# Patient Record
Sex: FEMALE | Race: BLACK OR AFRICAN AMERICAN | Employment: UNEMPLOYED | URBAN - METROPOLITAN AREA
[De-identification: names, ages, dates, MRNs, and addresses within clinical notes are randomized per-mention and may not be internally consistent; named-entity substitution may affect disease eponyms.]

---

## 2023-11-13 ENCOUNTER — HOSPITAL ENCOUNTER (EMERGENCY)
Age: 33
Discharge: HOME | End: 2023-11-13
Attending: STUDENT IN AN ORGANIZED HEALTH CARE EDUCATION/TRAINING PROGRAM
Payer: COMMERCIAL

## 2023-11-13 VITALS
TEMPERATURE: 97.9 F | DIASTOLIC BLOOD PRESSURE: 69 MMHG | OXYGEN SATURATION: 99 % | RESPIRATION RATE: 17 BRPM | HEART RATE: 86 BPM | SYSTOLIC BLOOD PRESSURE: 124 MMHG

## 2023-11-13 DIAGNOSIS — K64.4 EXTERNAL HEMORRHOID: Primary | ICD-10-CM

## 2023-11-13 PROCEDURE — 6370000000 HC RX 637 (ALT 250 FOR IP): Performed by: STUDENT IN AN ORGANIZED HEALTH CARE EDUCATION/TRAINING PROGRAM

## 2023-11-13 PROCEDURE — 99283 EMERGENCY DEPT VISIT LOW MDM: CPT

## 2023-11-13 RX ORDER — ACETAMINOPHEN 500 MG
1000 TABLET ORAL ONCE
Status: COMPLETED | OUTPATIENT
Start: 2023-11-13 | End: 2023-11-13

## 2023-11-13 RX ORDER — IBUPROFEN 800 MG/1
800 TABLET ORAL ONCE
Status: COMPLETED | OUTPATIENT
Start: 2023-11-13 | End: 2023-11-13

## 2023-11-13 RX ORDER — POLYETHYLENE GLYCOL 3350 17 G/17G
17 POWDER, FOR SOLUTION ORAL DAILY
Qty: 1530 G | Refills: 1 | Status: SHIPPED | OUTPATIENT
Start: 2023-11-13 | End: 2024-05-11

## 2023-11-13 RX ORDER — IBUPROFEN 600 MG/1
600 TABLET ORAL EVERY 6 HOURS PRN
Qty: 120 TABLET | Refills: 0 | Status: SHIPPED | OUTPATIENT
Start: 2023-11-13

## 2023-11-13 RX ORDER — DOCUSATE SODIUM 100 MG/1
100 CAPSULE, LIQUID FILLED ORAL 2 TIMES DAILY
Qty: 90 CAPSULE | Refills: 0 | Status: SHIPPED | OUTPATIENT
Start: 2023-11-13

## 2023-11-13 RX ORDER — HYDROCORTISONE 25 MG/G
CREAM TOPICAL
Qty: 1 EACH | Refills: 0 | Status: SHIPPED | OUTPATIENT
Start: 2023-11-13

## 2023-11-13 RX ADMIN — IBUPROFEN 800 MG: 800 TABLET, FILM COATED ORAL at 11:31

## 2023-11-13 RX ADMIN — ACETAMINOPHEN 1000 MG: 500 TABLET ORAL at 11:31

## 2023-11-13 ASSESSMENT — PAIN DESCRIPTION - DESCRIPTORS
DESCRIPTORS: THROBBING
DESCRIPTORS: THROBBING

## 2023-11-13 ASSESSMENT — LIFESTYLE VARIABLES
HOW MANY STANDARD DRINKS CONTAINING ALCOHOL DO YOU HAVE ON A TYPICAL DAY: PATIENT DOES NOT DRINK
HOW OFTEN DO YOU HAVE A DRINK CONTAINING ALCOHOL: NEVER

## 2023-11-13 ASSESSMENT — PAIN DESCRIPTION - ORIENTATION
ORIENTATION: OUTER
ORIENTATION: OUTER

## 2023-11-13 ASSESSMENT — PATIENT HEALTH QUESTIONNAIRE - PHQ9
SUM OF ALL RESPONSES TO PHQ QUESTIONS 1-9: 0
1. LITTLE INTEREST OR PLEASURE IN DOING THINGS: 0
SUM OF ALL RESPONSES TO PHQ9 QUESTIONS 1 & 2: 0
SUM OF ALL RESPONSES TO PHQ QUESTIONS 1-9: 0
SUM OF ALL RESPONSES TO PHQ QUESTIONS 1-9: 0
2. FEELING DOWN, DEPRESSED OR HOPELESS: 0
SUM OF ALL RESPONSES TO PHQ QUESTIONS 1-9: 0

## 2023-11-13 ASSESSMENT — PAIN - FUNCTIONAL ASSESSMENT
PAIN_FUNCTIONAL_ASSESSMENT: 0-10
PAIN_FUNCTIONAL_ASSESSMENT: 0-10

## 2023-11-13 ASSESSMENT — PAIN SCALES - GENERAL
PAINLEVEL_OUTOF10: 10
PAINLEVEL_OUTOF10: 10

## 2023-11-13 ASSESSMENT — PAIN DESCRIPTION - LOCATION
LOCATION: RECTUM
LOCATION: RECTUM

## 2023-11-13 ASSESSMENT — PAIN DESCRIPTION - PAIN TYPE
TYPE: ACUTE PAIN
TYPE: ACUTE PAIN

## 2023-11-13 NOTE — DISCHARGE INSTRUCTIONS
Use your medications as prescribed. Follow-up with the general surgeon you have been given to schedule an appointment for further evaluation.   Return if you develop any new or worsening symptoms

## 2023-11-13 NOTE — ED NOTES
Pt home in good condition. Pt verbalized understanding of d/c instructions. Pt denies questions at this time.  Prescriptions sent to her pharmacy of choice      Julissa Granda RN  11/13/23 8601

## 2024-05-06 ENCOUNTER — HOSPITAL ENCOUNTER (EMERGENCY)
Age: 34
Discharge: HOME | End: 2024-05-06
Attending: EMERGENCY MEDICINE
Payer: COMMERCIAL

## 2024-05-06 VITALS
SYSTOLIC BLOOD PRESSURE: 121 MMHG | HEART RATE: 69 BPM | DIASTOLIC BLOOD PRESSURE: 72 MMHG | TEMPERATURE: 98.8 F | RESPIRATION RATE: 16 BRPM | OXYGEN SATURATION: 100 %

## 2024-05-06 DIAGNOSIS — K64.4 BLEEDING EXTERNAL HEMORRHOIDS: Primary | ICD-10-CM

## 2024-05-06 PROCEDURE — 99283 EMERGENCY DEPT VISIT LOW MDM: CPT

## 2024-05-06 RX ORDER — DOCUSATE SODIUM 100 MG/1
100 CAPSULE, LIQUID FILLED ORAL 2 TIMES DAILY
Qty: 90 CAPSULE | Refills: 0 | Status: SHIPPED | OUTPATIENT
Start: 2024-05-06

## 2024-05-06 RX ORDER — HYDROCORTISONE 25 MG/G
CREAM TOPICAL
Qty: 1 EACH | Refills: 0 | Status: SHIPPED | OUTPATIENT
Start: 2024-05-06

## 2024-05-06 NOTE — DISCHARGE INSTRUCTIONS
1.  Medications as directed and Tylenol or ibuprofen over-the-counter for pain.  2.  Follow with [facility] surgery call for appointment.  3.  Follow-up with your primary care physician or call [phone] for primary care referral.  4.  Return emergency department for severe worsening pain or signs of serious infection such as high fevers chills or severe abdominal pain

## 2024-05-06 NOTE — ED PROVIDER NOTES
EMERGENCY DEPARTMENT ENCOUNTER     [facility]     Pt Name: Jyoti Polo   MRN: [id number]   Birthdate 1990   Date of evaluation: 5/6/2024   Provider: Alexx Gaming MD   PCP: No primary care provider on file.   Note Started: 9:41 AM EDT 5/6/24     CHIEF COMPLAINT     Chief Complaint   Patient presents with    Hemorrhoids     X 2 days     Rectal Pain        HISTORY OF PRESENT ILLNESS:  History from : Patient   Limitations to history : None     Jyoti Polo is a 34 y.o. female who presents with rectal pain and bleeding.  This is a 34-year-old -American female who presents with rectal pain and bleeding for 2 to 3 days.  Denies any abdominal pain no fevers chills and is similar to hemorrhoids that she has had in the past.  She has been attempting to get a primary care physician and follow-up with a general surgeon    Nursing Notes were all reviewed and agreed with or any disagreements were addressed in the HPI.     ROS: Positives and Pertinent negatives as per HPI.    PAST MEDICAL HISTORY     Past medical history:  has no past medical history on file.    Past surgical history:  has no past surgical history on file.      PHYSICAL EXAM:  ED Triage Vitals [05/06/24 0924]   BP Temp Temp src Pulse Respirations SpO2 Height Weight   121/72 98.8 °F (37.1 °C) -- 69 16 100 % -- --        Physical Exam  Exam conducted with a chaperone present.   Constitutional:       Appearance: Normal appearance.   HENT:      Head: Normocephalic.      Right Ear: External ear normal.      Left Ear: External ear normal.      Nose: Nose normal.      Mouth/Throat:      Mouth: Mucous membranes are moist.   Eyes:      Extraocular Movements: Extraocular movements intact.      Pupils: Pupils are equal, round, and reactive to light.   Cardiovascular:      Rate and Rhythm: Normal rate.   Pulmonary:      Effort: Pulmonary effort is normal.   Abdominal:      Palpations: Abdomen is soft.      Tenderness: There is no

## 2025-03-25 ENCOUNTER — HOSPITAL ENCOUNTER (EMERGENCY)
Age: 35
Discharge: HOME | End: 2025-03-25
Payer: COMMERCIAL

## 2025-03-25 VITALS
HEART RATE: 69 BPM | WEIGHT: 181.88 LBS | DIASTOLIC BLOOD PRESSURE: 61 MMHG | HEIGHT: 65 IN | BODY MASS INDEX: 30.3 KG/M2 | RESPIRATION RATE: 18 BRPM | SYSTOLIC BLOOD PRESSURE: 118 MMHG | TEMPERATURE: 98.6 F | OXYGEN SATURATION: 100 %

## 2025-03-25 DIAGNOSIS — K64.4 EXTERNAL HEMORRHOID: Primary | ICD-10-CM

## 2025-03-25 PROCEDURE — 99283 EMERGENCY DEPT VISIT LOW MDM: CPT

## 2025-03-25 RX ORDER — LIDOCAINE 50 MG/G
OINTMENT TOPICAL
Qty: 30 G | Refills: 0 | Status: ON HOLD | OUTPATIENT
Start: 2025-03-25

## 2025-03-25 RX ORDER — HYDROCORTISONE 25 MG/G
CREAM TOPICAL
Qty: 28 G | Refills: 0 | Status: ON HOLD | OUTPATIENT
Start: 2025-03-25

## 2025-03-25 NOTE — ED PROVIDER NOTES
[facility] EMERGENCY DEPARTMENT  [address] [place] [address]  Dept: [phone]  Loc: [phone]    EMERGENCY DEPARTMENT ENCOUNTER        This patient was not seen or evaluated by the attending physician.  I evaluated this patient, the attending physician was available for consultation.    CHIEF COMPLAINT    Chief Complaint   Patient presents with    Hemorrhoids       HPI    Jyoti Polo is a 35 y.o. female who presents with external hemorrhoid that is painful.  Onset was last night.  The quality of the pain is sharp.  The severity of the pain is moderate to severe.  States that it hurts to walk.  Has had hemorrhoids before in the past, feels exactly as it has in the past.  Has not seen a colorectal surgeon for removal.  States that she put on \"some cream.\"  She states that it has not helped.  Denies any hematochezia, or blood streaking in her stool.  The pain worsens with having a bowel movement as well.  There are no alleviating factors.  Patient came to the ED for further evaluation and treatment    REVIEW OF SYSTEMS    GI: No vomiting or abdominal pain  : No dysuria or hematuria  General: No fevers   All other systems reviewed and are negative.    PAST MEDICAL & SURGICAL HISTORY    Past Medical History:   Diagnosis Date    Hemorrhoid      History reviewed. No pertinent surgical history.    CURRENT MEDICATIONS    Current Outpatient Rx   Medication Sig Dispense Refill    hydrocortisone (ANUSOL-HC) 2.5 % CREA rectal cream Apply a pea size rectally to the hemorrhoid daily x7 days 28 g 0    lidocaine (XYLOCAINE) 5 % ointment Apply topically as needed to hemorrhoid. This is a numbing/pain medication 30 g 0    docusate sodium (COLACE) 100 MG capsule Take 1 capsule by mouth 2 times daily 90 capsule 0    ibuprofen (IBU) 600 MG tablet Take 1 tablet by mouth every 6 hours as needed for Pain 120 tablet 0       ALLERGIES    No Known Allergies    SOCIAL AND FAMILY HISTORY

## 2025-03-26 NOTE — ED NOTES
Hx of hemorrhoids.   Reports hemorrhoid became painful and swollen today. Pain at 9.   Using anusol for pain.   No recent constipation.   Discussed how to take stool softeners to prevent constipation.   No rectal bleeding or bleeding from hemorrhoid

## 2025-03-26 NOTE — ED NOTES
NP and another RN already examined hemorrhoid.  Now pt ready to go home. Still rates pain at a 9.   Discharge instructions with pt.  Explained rx's.   Encouraged follow up with colorectal surgeon as referred and with PCP. PCP referrals given.   Walked easily out of ED

## 2025-03-29 ENCOUNTER — HOSPITAL ENCOUNTER (INPATIENT)
Age: 35
LOS: 2 days | Discharge: HOME | DRG: 226 | End: 2025-03-31
Attending: STUDENT IN AN ORGANIZED HEALTH CARE EDUCATION/TRAINING PROGRAM | Admitting: SURGERY
Payer: COMMERCIAL

## 2025-03-29 ENCOUNTER — APPOINTMENT (OUTPATIENT)
Dept: CT IMAGING | Age: 35
DRG: 226 | End: 2025-03-29
Payer: COMMERCIAL

## 2025-03-29 DIAGNOSIS — L02.91 ABSCESS: ICD-10-CM

## 2025-03-29 DIAGNOSIS — K61.1 PERIRECTAL ABSCESS: Primary | ICD-10-CM

## 2025-03-29 LAB
ALBUMIN SERPL-MCNC: 4.3 G/DL (ref 3.4–5)
ALBUMIN/GLOB SERPL: 1.3 {RATIO} (ref 1.1–2.2)
ALP SERPL-CCNC: 70 U/L (ref 40–129)
ALT SERPL-CCNC: 14 U/L (ref 10–40)
ANION GAP SERPL CALCULATED.3IONS-SCNC: 12 MMOL/L (ref 3–16)
AST SERPL-CCNC: 17 U/L (ref 15–37)
BACTERIA URNS QL MICRO: ABNORMAL /HPF
BASOPHILS # BLD: 0 K/UL (ref 0–0.2)
BASOPHILS NFR BLD: 0.3 %
BILIRUB SERPL-MCNC: 0.3 MG/DL (ref 0–1)
BILIRUB UR QL STRIP.AUTO: NEGATIVE
BUN SERPL-MCNC: 7 MG/DL (ref 7–20)
CALCIUM SERPL-MCNC: 9.1 MG/DL (ref 8.3–10.6)
CHLORIDE SERPL-SCNC: 103 MMOL/L (ref 99–110)
CLARITY UR: ABNORMAL
CO2 SERPL-SCNC: 22 MMOL/L (ref 21–32)
COLOR UR: YELLOW
CREAT SERPL-MCNC: <0.5 MG/DL (ref 0.6–1.1)
DEPRECATED RDW RBC AUTO: 15.6 % (ref 12.4–15.4)
EOSINOPHIL # BLD: 0.1 K/UL (ref 0–0.6)
EOSINOPHIL NFR BLD: 0.6 %
EPI CELLS #/AREA URNS HPF: ABNORMAL /HPF (ref 0–5)
GFR SERPLBLD CREATININE-BSD FMLA CKD-EPI: >90 ML/MIN/{1.73_M2}
GLUCOSE SERPL-MCNC: 109 MG/DL (ref 70–99)
GLUCOSE UR STRIP.AUTO-MCNC: NEGATIVE MG/DL
HCG SERPL QL: NEGATIVE
HCT VFR BLD AUTO: 40.3 % (ref 36–48)
HGB BLD-MCNC: 13.4 G/DL (ref 12–16)
HGB UR QL STRIP.AUTO: ABNORMAL
KETONES UR STRIP.AUTO-MCNC: ABNORMAL MG/DL
LEUKOCYTE ESTERASE UR QL STRIP.AUTO: NEGATIVE
LYMPHOCYTES # BLD: 2.2 K/UL (ref 1–5.1)
LYMPHOCYTES NFR BLD: 15.6 %
MCH RBC QN AUTO: 27.4 PG (ref 26–34)
MCHC RBC AUTO-ENTMCNC: 33.2 G/DL (ref 31–36)
MCV RBC AUTO: 82.5 FL (ref 80–100)
MONOCYTES # BLD: 1 K/UL (ref 0–1.3)
MONOCYTES NFR BLD: 7.2 %
NEUTROPHILS # BLD: 10.6 K/UL (ref 1.7–7.7)
NEUTROPHILS NFR BLD: 76.3 %
NITRITE UR QL STRIP.AUTO: NEGATIVE
PH UR STRIP.AUTO: 5.5 [PH] (ref 5–8)
PLATELET # BLD AUTO: 234 K/UL (ref 135–450)
PMV BLD AUTO: 9.3 FL (ref 5–10.5)
POTASSIUM SERPL-SCNC: 3.7 MMOL/L (ref 3.5–5.1)
PROT SERPL-MCNC: 7.5 G/DL (ref 6.4–8.2)
PROT UR STRIP.AUTO-MCNC: 30 MG/DL
RBC # BLD AUTO: 4.88 M/UL (ref 4–5.2)
RBC #/AREA URNS HPF: ABNORMAL /HPF (ref 0–4)
SODIUM SERPL-SCNC: 137 MMOL/L (ref 136–145)
SP GR UR STRIP.AUTO: 1.02 (ref 1–1.03)
UA COMPLETE W REFLEX CULTURE PNL UR: YES
UA DIPSTICK W REFLEX MICRO PNL UR: YES
URN SPEC COLLECT METH UR: ABNORMAL
UROBILINOGEN UR STRIP-ACNC: 0.2 E.U./DL
WBC # BLD AUTO: 13.9 K/UL (ref 4–11)
WBC #/AREA URNS HPF: ABNORMAL /HPF (ref 0–5)

## 2025-03-29 PROCEDURE — 99285 EMERGENCY DEPT VISIT HI MDM: CPT

## 2025-03-29 PROCEDURE — 6360000004 HC RX CONTRAST MEDICATION: Performed by: STUDENT IN AN ORGANIZED HEALTH CARE EDUCATION/TRAINING PROGRAM

## 2025-03-29 PROCEDURE — 6370000000 HC RX 637 (ALT 250 FOR IP): Performed by: STUDENT IN AN ORGANIZED HEALTH CARE EDUCATION/TRAINING PROGRAM

## 2025-03-29 PROCEDURE — 6360000002 HC RX W HCPCS: Performed by: STUDENT IN AN ORGANIZED HEALTH CARE EDUCATION/TRAINING PROGRAM

## 2025-03-29 PROCEDURE — 81001 URINALYSIS AUTO W/SCOPE: CPT

## 2025-03-29 PROCEDURE — 87086 URINE CULTURE/COLONY COUNT: CPT

## 2025-03-29 PROCEDURE — 74177 CT ABD & PELVIS W/CONTRAST: CPT

## 2025-03-29 PROCEDURE — 2580000003 HC RX 258: Performed by: STUDENT IN AN ORGANIZED HEALTH CARE EDUCATION/TRAINING PROGRAM

## 2025-03-29 PROCEDURE — 36415 COLL VENOUS BLD VENIPUNCTURE: CPT

## 2025-03-29 PROCEDURE — 84703 CHORIONIC GONADOTROPIN ASSAY: CPT

## 2025-03-29 PROCEDURE — 1200000000 HC SEMI PRIVATE

## 2025-03-29 PROCEDURE — 85025 COMPLETE CBC W/AUTO DIFF WBC: CPT

## 2025-03-29 PROCEDURE — 80053 COMPREHEN METABOLIC PANEL: CPT

## 2025-03-29 PROCEDURE — 87040 BLOOD CULTURE FOR BACTERIA: CPT

## 2025-03-29 RX ORDER — IOPAMIDOL 755 MG/ML
75 INJECTION, SOLUTION INTRAVASCULAR
Status: COMPLETED | OUTPATIENT
Start: 2025-03-29 | End: 2025-03-29

## 2025-03-29 RX ORDER — OXYCODONE HYDROCHLORIDE 5 MG/1
5 TABLET ORAL ONCE
Refills: 0 | Status: COMPLETED | OUTPATIENT
Start: 2025-03-29 | End: 2025-03-29

## 2025-03-29 RX ORDER — VANCOMYCIN 1.5 G/300ML
1500 INJECTION, SOLUTION INTRAVENOUS ONCE
Status: COMPLETED | OUTPATIENT
Start: 2025-03-29 | End: 2025-03-30

## 2025-03-29 RX ADMIN — OXYCODONE 5 MG: 5 TABLET ORAL at 21:40

## 2025-03-29 RX ADMIN — IOPAMIDOL 75 ML: 755 INJECTION, SOLUTION INTRAVENOUS at 21:59

## 2025-03-29 RX ADMIN — CEFEPIME 2000 MG: 2 INJECTION, POWDER, FOR SOLUTION INTRAVENOUS at 23:05

## 2025-03-29 RX ADMIN — VANCOMYCIN 1500 MG: 1.5 INJECTION, SOLUTION INTRAVENOUS at 23:56

## 2025-03-29 ASSESSMENT — PAIN SCALES - GENERAL
PAINLEVEL_OUTOF10: 6
PAINLEVEL_OUTOF10: 10

## 2025-03-29 ASSESSMENT — PAIN - FUNCTIONAL ASSESSMENT: PAIN_FUNCTIONAL_ASSESSMENT: 0-10

## 2025-03-29 ASSESSMENT — PAIN DESCRIPTION - ONSET
ONSET: ON-GOING
ONSET: ON-GOING

## 2025-03-29 ASSESSMENT — PAIN DESCRIPTION - PAIN TYPE
TYPE: ACUTE PAIN
TYPE: ACUTE PAIN

## 2025-03-29 ASSESSMENT — PAIN DESCRIPTION - LOCATION
LOCATION: BUTTOCKS
LOCATION: BUTTOCKS

## 2025-03-29 ASSESSMENT — PAIN DESCRIPTION - ORIENTATION
ORIENTATION: OUTER
ORIENTATION: OUTER

## 2025-03-29 ASSESSMENT — PAIN DESCRIPTION - FREQUENCY: FREQUENCY: CONTINUOUS

## 2025-03-29 ASSESSMENT — PAIN DESCRIPTION - DESCRIPTORS: DESCRIPTORS: BURNING;PRESSURE

## 2025-03-30 ENCOUNTER — ANESTHESIA EVENT (OUTPATIENT)
Dept: OPERATING ROOM | Age: 35
End: 2025-03-30
Payer: COMMERCIAL

## 2025-03-30 ENCOUNTER — ANESTHESIA (OUTPATIENT)
Dept: OPERATING ROOM | Age: 35
End: 2025-03-30
Payer: COMMERCIAL

## 2025-03-30 PROCEDURE — 2500000003 HC RX 250 WO HCPCS: Performed by: SURGERY

## 2025-03-30 PROCEDURE — 6360000002 HC RX W HCPCS: Performed by: SURGERY

## 2025-03-30 PROCEDURE — 87185 SC STD ENZYME DETCJ PER NZM: CPT

## 2025-03-30 PROCEDURE — 3600000012 HC SURGERY LEVEL 2 ADDTL 15MIN: Performed by: SURGERY

## 2025-03-30 PROCEDURE — 2500000003 HC RX 250 WO HCPCS: Performed by: ANESTHESIOLOGY

## 2025-03-30 PROCEDURE — 6360000002 HC RX W HCPCS: Performed by: ANESTHESIOLOGY

## 2025-03-30 PROCEDURE — 6360000002 HC RX W HCPCS: Performed by: STUDENT IN AN ORGANIZED HEALTH CARE EDUCATION/TRAINING PROGRAM

## 2025-03-30 PROCEDURE — 87205 SMEAR GRAM STAIN: CPT

## 2025-03-30 PROCEDURE — 7100000001 HC PACU RECOVERY - ADDTL 15 MIN: Performed by: SURGERY

## 2025-03-30 PROCEDURE — 94760 N-INVAS EAR/PLS OXIMETRY 1: CPT

## 2025-03-30 PROCEDURE — 87076 CULTURE ANAEROBE IDENT EACH: CPT

## 2025-03-30 PROCEDURE — 87070 CULTURE OTHR SPECIMN AEROBIC: CPT

## 2025-03-30 PROCEDURE — 3700000000 HC ANESTHESIA ATTENDED CARE: Performed by: SURGERY

## 2025-03-30 PROCEDURE — 3700000001 HC ADD 15 MINUTES (ANESTHESIA): Performed by: SURGERY

## 2025-03-30 PROCEDURE — 87186 SC STD MICRODIL/AGAR DIL: CPT

## 2025-03-30 PROCEDURE — 2580000003 HC RX 258: Performed by: ANESTHESIOLOGY

## 2025-03-30 PROCEDURE — 87077 CULTURE AEROBIC IDENTIFY: CPT

## 2025-03-30 PROCEDURE — 2580000003 HC RX 258: Performed by: SURGERY

## 2025-03-30 PROCEDURE — 1200000000 HC SEMI PRIVATE

## 2025-03-30 PROCEDURE — 7100000000 HC PACU RECOVERY - FIRST 15 MIN: Performed by: SURGERY

## 2025-03-30 PROCEDURE — 87075 CULTR BACTERIA EXCEPT BLOOD: CPT

## 2025-03-30 PROCEDURE — 3600000002 HC SURGERY LEVEL 2 BASE: Performed by: SURGERY

## 2025-03-30 PROCEDURE — 0D9P0ZZ DRAINAGE OF RECTUM, OPEN APPROACH: ICD-10-PCS | Performed by: SURGERY

## 2025-03-30 RX ORDER — NALOXONE HYDROCHLORIDE 0.4 MG/ML
INJECTION, SOLUTION INTRAMUSCULAR; INTRAVENOUS; SUBCUTANEOUS PRN
Status: DISCONTINUED | OUTPATIENT
Start: 2025-03-30 | End: 2025-03-30 | Stop reason: HOSPADM

## 2025-03-30 RX ORDER — SODIUM CHLORIDE 0.9 % (FLUSH) 0.9 %
5-40 SYRINGE (ML) INJECTION EVERY 12 HOURS SCHEDULED
Status: DISCONTINUED | OUTPATIENT
Start: 2025-03-30 | End: 2025-03-31 | Stop reason: HOSPADM

## 2025-03-30 RX ORDER — ENOXAPARIN SODIUM 100 MG/ML
40 INJECTION SUBCUTANEOUS DAILY
Status: DISCONTINUED | OUTPATIENT
Start: 2025-03-30 | End: 2025-03-31 | Stop reason: HOSPADM

## 2025-03-30 RX ORDER — ACETAMINOPHEN 650 MG/1
650 SUPPOSITORY RECTAL EVERY 6 HOURS PRN
Status: DISCONTINUED | OUTPATIENT
Start: 2025-03-30 | End: 2025-03-31 | Stop reason: HOSPADM

## 2025-03-30 RX ORDER — ONDANSETRON 2 MG/ML
INJECTION INTRAMUSCULAR; INTRAVENOUS
Status: DISCONTINUED | OUTPATIENT
Start: 2025-03-30 | End: 2025-03-30 | Stop reason: SDUPTHER

## 2025-03-30 RX ORDER — LIDOCAINE HYDROCHLORIDE 20 MG/ML
INJECTION, SOLUTION EPIDURAL; INFILTRATION; INTRACAUDAL; PERINEURAL
Status: DISCONTINUED | OUTPATIENT
Start: 2025-03-30 | End: 2025-03-30 | Stop reason: SDUPTHER

## 2025-03-30 RX ORDER — SUCCINYLCHOLINE/SOD CL,ISO/PF 200MG/10ML
SYRINGE (ML) INTRAVENOUS
Status: DISCONTINUED | OUTPATIENT
Start: 2025-03-30 | End: 2025-03-30 | Stop reason: SDUPTHER

## 2025-03-30 RX ORDER — SODIUM CHLORIDE 0.9 % (FLUSH) 0.9 %
5-40 SYRINGE (ML) INJECTION PRN
Status: DISCONTINUED | OUTPATIENT
Start: 2025-03-30 | End: 2025-03-31 | Stop reason: HOSPADM

## 2025-03-30 RX ORDER — OXYCODONE AND ACETAMINOPHEN 5; 325 MG/1; MG/1
1 TABLET ORAL EVERY 4 HOURS PRN
Status: DISCONTINUED | OUTPATIENT
Start: 2025-03-30 | End: 2025-03-31 | Stop reason: HOSPADM

## 2025-03-30 RX ORDER — ONDANSETRON 2 MG/ML
4 INJECTION INTRAMUSCULAR; INTRAVENOUS EVERY 6 HOURS PRN
Status: DISCONTINUED | OUTPATIENT
Start: 2025-03-30 | End: 2025-03-31 | Stop reason: HOSPADM

## 2025-03-30 RX ORDER — ACETAMINOPHEN 325 MG/1
650 TABLET ORAL EVERY 6 HOURS PRN
Status: DISCONTINUED | OUTPATIENT
Start: 2025-03-30 | End: 2025-03-31 | Stop reason: HOSPADM

## 2025-03-30 RX ORDER — ONDANSETRON 2 MG/ML
4 INJECTION INTRAMUSCULAR; INTRAVENOUS
Status: DISCONTINUED | OUTPATIENT
Start: 2025-03-30 | End: 2025-03-30 | Stop reason: HOSPADM

## 2025-03-30 RX ORDER — SODIUM CHLORIDE 9 MG/ML
INJECTION, SOLUTION INTRAVENOUS CONTINUOUS
Status: ACTIVE | OUTPATIENT
Start: 2025-03-30 | End: 2025-03-31

## 2025-03-30 RX ORDER — DEXAMETHASONE SODIUM PHOSPHATE 4 MG/ML
INJECTION, SOLUTION INTRA-ARTICULAR; INTRALESIONAL; INTRAMUSCULAR; INTRAVENOUS; SOFT TISSUE
Status: DISCONTINUED | OUTPATIENT
Start: 2025-03-30 | End: 2025-03-30 | Stop reason: SDUPTHER

## 2025-03-30 RX ORDER — FENTANYL CITRATE 50 UG/ML
25 INJECTION, SOLUTION INTRAMUSCULAR; INTRAVENOUS EVERY 5 MIN PRN
Status: DISCONTINUED | OUTPATIENT
Start: 2025-03-30 | End: 2025-03-30 | Stop reason: HOSPADM

## 2025-03-30 RX ORDER — POLYETHYLENE GLYCOL 3350 17 G/17G
17 POWDER, FOR SOLUTION ORAL DAILY PRN
Status: DISCONTINUED | OUTPATIENT
Start: 2025-03-30 | End: 2025-03-31 | Stop reason: HOSPADM

## 2025-03-30 RX ORDER — MAGNESIUM SULFATE IN WATER 40 MG/ML
2000 INJECTION, SOLUTION INTRAVENOUS PRN
Status: DISCONTINUED | OUTPATIENT
Start: 2025-03-30 | End: 2025-03-31 | Stop reason: HOSPADM

## 2025-03-30 RX ORDER — SODIUM CHLORIDE 9 MG/ML
INJECTION, SOLUTION INTRAVENOUS PRN
Status: DISCONTINUED | OUTPATIENT
Start: 2025-03-30 | End: 2025-03-31 | Stop reason: HOSPADM

## 2025-03-30 RX ORDER — FENTANYL CITRATE 50 UG/ML
INJECTION, SOLUTION INTRAMUSCULAR; INTRAVENOUS
Status: DISCONTINUED | OUTPATIENT
Start: 2025-03-30 | End: 2025-03-30 | Stop reason: SDUPTHER

## 2025-03-30 RX ORDER — MAGNESIUM HYDROXIDE 1200 MG/15ML
LIQUID ORAL CONTINUOUS PRN
Status: DISCONTINUED | OUTPATIENT
Start: 2025-03-30 | End: 2025-03-30 | Stop reason: HOSPADM

## 2025-03-30 RX ORDER — POTASSIUM CHLORIDE 1500 MG/1
40 TABLET, EXTENDED RELEASE ORAL PRN
Status: DISCONTINUED | OUTPATIENT
Start: 2025-03-30 | End: 2025-03-31 | Stop reason: HOSPADM

## 2025-03-30 RX ORDER — FENTANYL CITRATE 50 UG/ML
25 INJECTION, SOLUTION INTRAMUSCULAR; INTRAVENOUS ONCE
Refills: 0 | Status: COMPLETED | OUTPATIENT
Start: 2025-03-30 | End: 2025-03-30

## 2025-03-30 RX ORDER — SODIUM CHLORIDE 9 MG/ML
INJECTION, SOLUTION INTRAVENOUS
Status: DISCONTINUED | OUTPATIENT
Start: 2025-03-30 | End: 2025-03-30 | Stop reason: SDUPTHER

## 2025-03-30 RX ORDER — SODIUM CHLORIDE 0.9 % (FLUSH) 0.9 %
5-40 SYRINGE (ML) INJECTION EVERY 12 HOURS SCHEDULED
Status: DISCONTINUED | OUTPATIENT
Start: 2025-03-30 | End: 2025-03-30 | Stop reason: HOSPADM

## 2025-03-30 RX ORDER — ONDANSETRON 4 MG/1
4 TABLET, ORALLY DISINTEGRATING ORAL EVERY 8 HOURS PRN
Status: DISCONTINUED | OUTPATIENT
Start: 2025-03-30 | End: 2025-03-31 | Stop reason: HOSPADM

## 2025-03-30 RX ORDER — POTASSIUM CHLORIDE 7.45 MG/ML
10 INJECTION INTRAVENOUS PRN
Status: DISCONTINUED | OUTPATIENT
Start: 2025-03-30 | End: 2025-03-31 | Stop reason: HOSPADM

## 2025-03-30 RX ORDER — SODIUM CHLORIDE 0.9 % (FLUSH) 0.9 %
5-40 SYRINGE (ML) INJECTION PRN
Status: DISCONTINUED | OUTPATIENT
Start: 2025-03-30 | End: 2025-03-30 | Stop reason: HOSPADM

## 2025-03-30 RX ORDER — PROPOFOL 10 MG/ML
INJECTION, EMULSION INTRAVENOUS
Status: DISCONTINUED | OUTPATIENT
Start: 2025-03-30 | End: 2025-03-30 | Stop reason: SDUPTHER

## 2025-03-30 RX ORDER — MORPHINE SULFATE 4 MG/ML
4 INJECTION, SOLUTION INTRAMUSCULAR; INTRAVENOUS
Status: DISCONTINUED | OUTPATIENT
Start: 2025-03-30 | End: 2025-03-31 | Stop reason: HOSPADM

## 2025-03-30 RX ORDER — SODIUM CHLORIDE 9 MG/ML
INJECTION, SOLUTION INTRAVENOUS PRN
Status: DISCONTINUED | OUTPATIENT
Start: 2025-03-30 | End: 2025-03-30 | Stop reason: HOSPADM

## 2025-03-30 RX ORDER — MORPHINE SULFATE 2 MG/ML
2 INJECTION, SOLUTION INTRAMUSCULAR; INTRAVENOUS
Status: DISCONTINUED | OUTPATIENT
Start: 2025-03-30 | End: 2025-03-31 | Stop reason: HOSPADM

## 2025-03-30 RX ADMIN — ONDANSETRON 4 MG: 2 INJECTION INTRAMUSCULAR; INTRAVENOUS at 10:30

## 2025-03-30 RX ADMIN — FENTANYL CITRATE 100 MCG: 50 INJECTION INTRAMUSCULAR; INTRAVENOUS at 10:30

## 2025-03-30 RX ADMIN — SODIUM CHLORIDE, PRESERVATIVE FREE 10 ML: 5 INJECTION INTRAVENOUS at 20:29

## 2025-03-30 RX ADMIN — FENTANYL CITRATE 25 MCG: 50 INJECTION INTRAMUSCULAR; INTRAVENOUS at 01:19

## 2025-03-30 RX ADMIN — MORPHINE SULFATE 4 MG: 4 INJECTION, SOLUTION INTRAMUSCULAR; INTRAVENOUS at 03:58

## 2025-03-30 RX ADMIN — Medication 100 MG: at 10:30

## 2025-03-30 RX ADMIN — MORPHINE SULFATE 2 MG: 2 INJECTION, SOLUTION INTRAMUSCULAR; INTRAVENOUS at 05:55

## 2025-03-30 RX ADMIN — SODIUM CHLORIDE: 0.9 INJECTION, SOLUTION INTRAVENOUS at 02:08

## 2025-03-30 RX ADMIN — MORPHINE SULFATE 4 MG: 4 INJECTION, SOLUTION INTRAMUSCULAR; INTRAVENOUS at 08:00

## 2025-03-30 RX ADMIN — SODIUM CHLORIDE: 9 INJECTION, SOLUTION INTRAVENOUS at 10:25

## 2025-03-30 RX ADMIN — DEXAMETHASONE SODIUM PHOSPHATE 10 MG: 4 INJECTION, SOLUTION INTRAMUSCULAR; INTRAVENOUS at 10:30

## 2025-03-30 RX ADMIN — SODIUM CHLORIDE: 0.9 INJECTION, SOLUTION INTRAVENOUS at 18:04

## 2025-03-30 RX ADMIN — LIDOCAINE HYDROCHLORIDE 80 MG: 20 INJECTION, SOLUTION EPIDURAL; INFILTRATION; INTRACAUDAL; PERINEURAL at 10:30

## 2025-03-30 RX ADMIN — PROPOFOL 150 MG: 10 INJECTION, EMULSION INTRAVENOUS at 10:30

## 2025-03-30 RX ADMIN — HYDROMORPHONE HYDROCHLORIDE 1 MG: 1 INJECTION, SOLUTION INTRAMUSCULAR; INTRAVENOUS; SUBCUTANEOUS at 10:37

## 2025-03-30 ASSESSMENT — PAIN DESCRIPTION - DESCRIPTORS
DESCRIPTORS: BURNING
DESCRIPTORS: BURNING
DESCRIPTORS: BURNING;PRESSURE

## 2025-03-30 ASSESSMENT — PAIN - FUNCTIONAL ASSESSMENT
PAIN_FUNCTIONAL_ASSESSMENT: PREVENTS OR INTERFERES SOME ACTIVE ACTIVITIES AND ADLS
PAIN_FUNCTIONAL_ASSESSMENT: 0-10

## 2025-03-30 ASSESSMENT — PAIN SCALES - GENERAL
PAINLEVEL_OUTOF10: 9
PAINLEVEL_OUTOF10: 6
PAINLEVEL_OUTOF10: 0
PAINLEVEL_OUTOF10: 10
PAINLEVEL_OUTOF10: 0
PAINLEVEL_OUTOF10: 3
PAINLEVEL_OUTOF10: 3
PAINLEVEL_OUTOF10: 9

## 2025-03-30 ASSESSMENT — PAIN DESCRIPTION - LOCATION
LOCATION: RECTUM
LOCATION: BUTTOCKS
LOCATION: RECTUM

## 2025-03-30 ASSESSMENT — PAIN DESCRIPTION - ONSET
ONSET: ON-GOING
ONSET: ON-GOING

## 2025-03-30 ASSESSMENT — PAIN DESCRIPTION - FREQUENCY
FREQUENCY: CONTINUOUS
FREQUENCY: CONTINUOUS

## 2025-03-30 ASSESSMENT — PAIN DESCRIPTION - ORIENTATION
ORIENTATION: MID
ORIENTATION: MID
ORIENTATION: OUTER

## 2025-03-30 ASSESSMENT — PAIN DESCRIPTION - PAIN TYPE
TYPE: ACUTE PAIN
TYPE: ACUTE PAIN

## 2025-03-30 NOTE — ED TRIAGE NOTES
Pt has hemmroids on buttocks that has been there since Monday. Pt did come here on Monday and received some cream which she states is not working. Pain has increased and site is swelling more. Pt states pain is 10/10 at this time. Pt is A&O x4 and ind. VSS at this time. Pt is resting in bed with gown on. Call light within reach. Visitor at bedside.

## 2025-03-30 NOTE — BRIEF OP NOTE
Brief Postoperative Note      Patient: Jyoti Polo  YOB: 1990  MRN: [id number]    Date of Procedure: 3/30/2025    Pre-Op Diagnosis Codes:      * Abscess [L02.91]perirectal    Post-Op Diagnosis: Same       Procedure(s):  INCISION AND DRAINAGE PERIRECTAL ABSCESS    Surgeon(s):  Nacho Canas MD    Assistant:  Surgical Assistant: Laura Martino    Anesthesia: General    Estimated Blood Loss (mL): less than 50     Complications: None    Specimens:   ID Type Source Tests Collected by Time Destination   1 : 1. PERIRECTAL ABSCESS Tissue Tissue CULTURE, SURGICAL Nacho Canas MD 3/30/2025 1042        Implants:  * No implants in log *      Drains: * No LDAs found *    Findings:  Infection Present At Time Of Surgery (PATOS) (choose all levels that have infection present):  - Deep Infection (muscle/fascia) present as evidenced by abscess and pus  Other Findings: horseshoe abscess anterior perirectal space    Electronically signed by NACHO CANAS MD on 3/30/2025 at 10:55 AM

## 2025-03-30 NOTE — ED PROVIDER NOTES
[facility] EMERGENCY DEPARTMENT      EMERGENCY MEDICINE     Pt Name: Jyoti Polo  MRN: [id number]  Birthdate 1990  Date of evaluation: 3/29/2025  Provider: Juan Mcallister DO    CHIEF COMPLAINT       Chief Complaint   Patient presents with    Hemorrhoids     Pt has hemmroids on buttocks that has been there since Monday. Pt did come here on Monday and received some cream which she states is not working. Pain has increased and site is swelling more. Pt states pain is 10/10 at this time.      HISTORY OF PRESENT ILLNESS   Jyoti Polo is a 35 y.o. female who presents to the emergency department for rectal pain.  Patient states that she was diagnosed with hemorrhoids when she was here on Monday and received cream and gel which she states is not working.  She appears to have severe pain.  She denies running any fevers.  States the site is more swollen than prior.  Complaining of 10 out of 10 pain.  Has not been taking sitz bath's.  No other medical history or complaints.        PASTMEDICAL HISTORY     Past Medical History:   Diagnosis Date    Hemorrhoid        Patient Active Problem List   Diagnosis Code    Perirectal abscess K61.1     SURGICAL HISTORY     History reviewed. No pertinent surgical history.    CURRENT MEDICATIONS       Previous Medications    DOCUSATE SODIUM (COLACE) 100 MG CAPSULE    Take 1 capsule by mouth 2 times daily    HYDROCORTISONE (ANUSOL-HC) 2.5 % CREA RECTAL CREAM    Apply a pea size rectally to the hemorrhoid daily x7 days    IBUPROFEN (IBU) 600 MG TABLET    Take 1 tablet by mouth every 6 hours as needed for Pain    LIDOCAINE (XYLOCAINE) 5 % OINTMENT    Apply topically as needed to hemorrhoid. This is a numbing/pain medication       ALLERGIES     has no known allergies.    FAMILY HISTORY     has no family status information on file.        SOCIAL HISTORY       Social History     Tobacco Use    Smoking status: Never   Substance Use Topics    Alcohol use: No    Drug use: No

## 2025-03-30 NOTE — PROGRESS NOTES
Patient continues to have no complaints of pain or discomfort, assist to bathroom by this RN. IVF infusing, family at bedside.

## 2025-03-30 NOTE — PROGRESS NOTES
Patient back from PACU, A/Ox4 and vital signs stable, has no complaints of pain only soreness in throat. IVF infusing, dressing to sadie rectal abscess clean, dry and intact at this time.     This RN spoke to patients  via telephone and gave an update on care.

## 2025-03-30 NOTE — CONSULTS
Clinical Pharmacy Note  Vancomycin Consult    Pharmacy consult received for one-time dose of vancomycin in the Emergency Department per Dr. Mcallister.    Ht Readings from Last 1 Encounters:   03/29/25 1.651 m (5' 5\")        Wt Readings from Last 1 Encounters:   03/29/25 81 kg (178 lb 9.2 oz)         Assessment/Plan:  Vancomycin 1500mg x 1 in ED.  If vancomycin is to continue on admission and pharmacy is to manage dosing, please re-consult with admission orders.    Karon Paul, LauraD

## 2025-03-30 NOTE — OP NOTE
[facility]          [address] [place] [address]                            OPERATIVE REPORT      PATIENT NAME: LUCIANO BROUSSARD                  : 1990  MED REC NO: [id number]                      ROOM: OR  ACCOUNT NO: [id number]                       ADMIT DATE: 2025  PROVIDER: Nacho Olivares MD      DATE OF PROCEDURE:  2025    SURGEON:  Nacho Olivares MD    PREOPERATIVE DIAGNOSIS:  Perirectal abscess.    POSTOPERATIVE DIAGNOSIS:  Perirectal abscess.    PROCEDURE:  Incision and drainage of perirectal abscess.    SPECIMEN:  Cultures.    ESTIMATED BLOOD LOSS:  Less than 50 mL.    COMPLICATIONS:  None.    DISPOSITION:  To Recovery in stable condition.    INDICATIONS:  The patient is a 35-year-old female who presented to the emergency room with several-day history of worsening perianal pain and swelling.  In the emergency room, a CT scan was obtained showing a horseshoe-shaped abscess on the anterior perirectal space.  She was admitted for IV antibiotics and we proceeded with incision and drainage today.  The risks, benefits, and alternatives were reviewed, and she agreed to proceed.    DESCRIPTION OF PROCEDURE:  The patient was brought to the operating room, placed supine.  General anesthesia performed and she was repositioned in the lithotomy.  The perianal space was prepped and draped in a sterile fashion.  In the anterior perianal region, there was fullness, so a radial incision was made just left to the midline.  Dissection was carried through the subcutaneous tissue and into the deep perirectal space where an obvious abscess was entered and pus was drained.  Cultures were obtained.  The abscess cavity suctioned clear and then using a hemostat, gentle blunt dissection performed to disrupt any loculations.  The abscess did extend over the midline to the right perirectal space as indicated on the CT scan, but it appeared that

## 2025-03-30 NOTE — H&P
PATIENT NAME: Jyoti Polo   YOB: 1990    ADMISSION DATE: 3/29/2025  8:29 PM      TODAY'S DATE: 3/30/2025    CHIEF COMPLAINT:  perianal pain      HISTORY OF PRESENT ILLNESS:  The patient is a 35 y.o. female  who presents with 3-4 days of worsening pain the perianal region. History of hemorrhoids but no previous abscesses. Seen in ER two days ago and treated for recurring hemorrhoids. Pain worsened and she presented again last night. Noted to have leukocytosis and CT shows a perirectal abscess. Admitted for IV antibiotics and I&D today. The risks, benefits and alternatives to the planned procedure were discussed. Patient expressed an understanding and is willing to proceed.    CT scan imaging was independently reviewed by me today      Past Medical History:        Diagnosis Date    Hemorrhoid        Past Surgical History:    History reviewed. No pertinent surgical history.    Medications Prior to Admission:   Medications Prior to Admission: hydrocortisone (ANUSOL-HC) 2.5 % CREA rectal cream, Apply a pea size rectally to the hemorrhoid daily x7 days  lidocaine (XYLOCAINE) 5 % ointment, Apply topically as needed to hemorrhoid. This is a numbing/pain medication  docusate sodium (COLACE) 100 MG capsule, Take 1 capsule by mouth 2 times daily  ibuprofen (IBU) 600 MG tablet, Take 1 tablet by mouth every 6 hours as needed for Pain    Allergies:  Patient has no known allergies.    Social History:   TOBACCO:   reports that she has never smoked. She does not have any smokeless tobacco history on file.  ETOH:   reports no history of alcohol use.  DRUGS:   reports no history of drug use.      Family History:   Reviewed and non contributory to the current clinical condition    REVIEW OF SYSTEMS:    CONSTITUTIONAL:  negative  HEENT:  negative  CARDIOVASCULAR:  negative  GASTROINTESTINAL:  positive for perianal pain  GENITOURINARY:  negative  HEMATOLOGIC/LYMPHATIC:  negative  ENDOCRINE:  negative  All other systems

## 2025-03-30 NOTE — PROGRESS NOTES
4 Eyes Skin Assessment     NAME:  Jyoti Polo  YOB: 1990  MEDICAL RECORD NUMBER:  [id number]    The patient is being assessed for  Admission    I agree that at least one RN has performed a thorough Head to Toe Skin Assessment on the patient. ALL assessment sites listed below have been assessed.      Areas assessed by both nurses:    Head, Face, Ears, Shoulders, Back, Chest, Arms, Elbows, Hands, Sacrum. Buttock, Coccyx, Ischium, Legs. Feet and Heels, and Under Medical Devices         Does the Patient have a Wound? No noted wound(s)       Jose Prevention initiated by RN: No  Wound Care Orders initiated by RN: No    Pressure Injury (Stage 3,4, Unstageable, DTI, NWPT, and Complex wounds) if present, place Wound referral order by RN under : No    New Ostomies, if present place, Ostomy referral order under : No     Nurse 1 eSignature: Electronically signed by Rocio Bhandari RN on 3/30/25 at 2:36 AM EDT    **SHARE this note so that the co-signing nurse can place an eSignature**    Nurse 2 eSignature: {Esignature:[id number]}

## 2025-03-30 NOTE — PLAN OF CARE
Problem: Discharge Planning  Goal: Discharge to home or other facility with appropriate resources  3/30/2025 1251 by Millicent Jenkins RN  Outcome: Progressing  Flowsheets (Taken 3/30/2025 1251)  Discharge to home or other facility with appropriate resources: Identify barriers to discharge with patient and caregiver  3/30/2025 0229 by Rocio Bhandari RN  Outcome: Progressing  Flowsheets (Taken 3/30/2025 0229)  Discharge to home or other facility with appropriate resources:   Identify discharge learning needs (meds, wound care, etc)   Refer to discharge planning if patient needs post-hospital services based on physician order or complex needs related to functional status, cognitive ability or social support system   Identify barriers to discharge with patient and caregiver   Arrange for needed discharge resources and transportation as appropriate   Arrange for interpreters to assist at discharge as needed     Problem: Pain  Goal: Verbalizes/displays adequate comfort level or baseline comfort level  3/30/2025 1251 by Millicent Jenkins RN  Outcome: Progressing  Flowsheets (Taken 3/30/2025 1251)  Verbalizes/displays adequate comfort level or baseline comfort level: Encourage patient to monitor pain and request assistance  3/30/2025 0229 by Rocio Bhandari RN  Outcome: Progressing  Flowsheets (Taken 3/30/2025 0229)  Verbalizes/displays adequate comfort level or baseline comfort level:   Consider cultural and social influences on pain and pain management   Administer analgesics based on type and severity of pain and evaluate response   Encourage patient to monitor pain and request assistance   Implement non-pharmacological measures as appropriate and evaluate response   Assess pain using appropriate pain scale   Notify Licensed Independent Practitioner if interventions unsuccessful or patient reports new pain     Problem: ABCDS Injury Assessment  Goal: Absence of physical injury  Outcome: Progressing  Flowsheets

## 2025-03-30 NOTE — PROGRESS NOTES
Patient called out asking for assistance to the bathroom, patient back in bed and having complaints of worsening pain. PRN Morphine given, vital signs assessed and stable. Patient states she is exhausted as she did not sleep all night, sign posted on patients door to call this RN before entering to promote patients rest.     Call light in reach and bed alarm on.

## 2025-03-30 NOTE — PLAN OF CARE
Problem: Discharge Planning  Goal: Discharge to home or other facility with appropriate resources  Outcome: Progressing  Flowsheets (Taken 3/30/2025 0229)  Discharge to home or other facility with appropriate resources:   Identify discharge learning needs (meds, wound care, etc)   Refer to discharge planning if patient needs post-hospital services based on physician order or complex needs related to functional status, cognitive ability or social support system   Identify barriers to discharge with patient and caregiver   Arrange for needed discharge resources and transportation as appropriate   Arrange for interpreters to assist at discharge as needed     Problem: Pain  Goal: Verbalizes/displays adequate comfort level or baseline comfort level  Outcome: Progressing  Flowsheets (Taken 3/30/2025 0229)  Verbalizes/displays adequate comfort level or baseline comfort level:   Consider cultural and social influences on pain and pain management   Administer analgesics based on type and severity of pain and evaluate response   Encourage patient to monitor pain and request assistance   Implement non-pharmacological measures as appropriate and evaluate response   Assess pain using appropriate pain scale   Notify Licensed Independent Practitioner if interventions unsuccessful or patient reports new pain

## 2025-03-30 NOTE — PROGRESS NOTES
Patient arrived to unit from ED and was placed into room 3131. Patient A&O x 4. Patient oriented to room, unit routine, call light/telephone use, bed/chair alarm implementation, and meal ordering process. Patient reports understanding, denies questions. Patient denies physical/emotional needs at this time. Call light, telephone, and bed side table are within reach. Fall precautions in place. Will continue to monitor and assess.        Electronically signed by Rocio Bhandari RN on 3/30/2025 at 2:35 AM

## 2025-03-30 NOTE — ED NOTES
Pt being transported to [facility] by [facility]. No signs of distress upon departure. VSS Pt going to room 3131. Report called to [facility]. All questions and concerns answered.

## 2025-03-31 VITALS
TEMPERATURE: 98.2 F | RESPIRATION RATE: 16 BRPM | HEART RATE: 70 BPM | BODY MASS INDEX: 29.75 KG/M2 | WEIGHT: 178.57 LBS | OXYGEN SATURATION: 98 % | HEIGHT: 65 IN | DIASTOLIC BLOOD PRESSURE: 66 MMHG | SYSTOLIC BLOOD PRESSURE: 106 MMHG

## 2025-03-31 LAB — BACTERIA UR CULT: NORMAL

## 2025-03-31 PROCEDURE — 94760 N-INVAS EAR/PLS OXIMETRY 1: CPT

## 2025-03-31 PROCEDURE — APPSS15 APP SPLIT SHARED TIME 0-15 MINUTES: Performed by: PHYSICIAN ASSISTANT

## 2025-03-31 PROCEDURE — 2500000003 HC RX 250 WO HCPCS: Performed by: SURGERY

## 2025-03-31 PROCEDURE — 99024 POSTOP FOLLOW-UP VISIT: CPT | Performed by: PHYSICIAN ASSISTANT

## 2025-03-31 PROCEDURE — 6360000002 HC RX W HCPCS: Performed by: SURGERY

## 2025-03-31 PROCEDURE — APPNB15 APP NON BILLABLE TIME 0-15 MINS: Performed by: PHYSICIAN ASSISTANT

## 2025-03-31 RX ORDER — OXYCODONE AND ACETAMINOPHEN 5; 325 MG/1; MG/1
1 TABLET ORAL EVERY 6 HOURS PRN
Qty: 20 TABLET | Refills: 0 | Status: SHIPPED | OUTPATIENT
Start: 2025-03-31 | End: 2025-04-05

## 2025-03-31 RX ADMIN — SODIUM CHLORIDE, PRESERVATIVE FREE 10 ML: 5 INJECTION INTRAVENOUS at 08:48

## 2025-03-31 RX ADMIN — ENOXAPARIN SODIUM 40 MG: 100 INJECTION SUBCUTANEOUS at 08:46

## 2025-03-31 ASSESSMENT — PAIN SCALES - GENERAL: PAINLEVEL_OUTOF10: 0

## 2025-03-31 NOTE — PROGRESS NOTES
Patient is alert and oriented x 4 in bed and up as tolerated. Vital, assessment and daily care given. Education and care plan updated. Medication given as per order. Fall precaution initiated, call light within reach, bed in low position and wheels locked. Patient moves independently, room air, IVF Continue. Addressed current patient's need. Continue to monitor.       Electronically signed by Rocio Bhandari RN on 3/31/2025 at 12:05 AM

## 2025-03-31 NOTE — DISCHARGE INSTRUCTIONS
Follow up in 2 weeks  Call [phone] for an appointment  Remove dressings later today then cover area with a clean bandage  Ok to shower   No Driving for 2 days. OK to drive at that time if you are not taking any pain medication.

## 2025-03-31 NOTE — PROGRESS NOTES
General and Vascular Surgery                                                           Daily Progress Note                                                             Sha Stahl PA-C    Pt Name: Jyoti Polo  Medical Record Number: [id number]  Date of Birth 1990   Today's Date: 3/31/2025    ASSESSMENT/PLAN  S/P (3/30/25): Incision and drainage of perirectal abscess.   Pain controlled  Dressing and packing changed  Denies any nausea or emesis  Tolerating regular diet  Antibiotics  OOB and ambulate home soon  Antibiotics    EDUCATION  Patient educated about their illness/diagnosis, stated above, and all questions answered. We discussed the importance of nutrition, medications they are taking, and healthy lifestyle.  SUBJECTIVE  Jyoti has improved from yesterday. Pain is well controlled. She has no nausea and no vomiting.  She is tolerating regular diet. Current activity is ad gayle    OBJECTIVE  VITALS:  height is 1.651 m (5' 5\") and weight is 81 kg (178 lb 9.2 oz). Her oral temperature is 98.2 °F (36.8 °C). Her blood pressure is 106/66 and her pulse is 70. Her respiration is 16 and oxygen saturation is 98%. VITALS:  /66   Pulse 70   Temp 98.2 °F (36.8 °C) (Oral)   Resp 16   Ht 1.651 m (5' 5\")   Wt 81 kg (178 lb 9.2 oz)   LMP 03/29/2025 (Exact Date)   SpO2 98%   BMI 29.72 kg/m²   INTAKE/OUTPUT:    Intake/Output Summary (Last 24 hours) at 3/31/2025 1322  Last data filed at 3/31/2025 1057  Gross per 24 hour   Intake 1269.94 ml   Output --   Net 1269.94 ml     GENERAL: alert, no distress  LUNGS: clear to ausculation, without wheezes, rales or rhonci  HEART: normal rate and regular rhythm  ABDOMEN: soft, non-tender, non-distended  BUTTOCKS: Left gluteal abscess, wound site looks good. No drainage. Pain controlled.   EXTREMITY: no cyanosis, clubbing or edema  I/O last 3 completed shifts:  In: 1558.8 [P.O.:480; I.V.:789.9; IV

## 2025-03-31 NOTE — PLAN OF CARE
Problem: Discharge Planning  Goal: Discharge to home or other facility with appropriate resources  3/30/2025 2350 by Rocio Bhandari RN  Outcome: Progressing  Flowsheets (Taken 3/30/2025 2350)  Discharge to home or other facility with appropriate resources:   Identify barriers to discharge with patient and caregiver   Identify discharge learning needs (meds, wound care, etc)   Refer to discharge planning if patient needs post-hospital services based on physician order or complex needs related to functional status, cognitive ability or social support system   Arrange for needed discharge resources and transportation as appropriate     Problem: Pain  Goal: Verbalizes/displays adequate comfort level or baseline comfort level  3/30/2025 2350 by Rocio Bhandari RN  Outcome: Progressing  Flowsheets (Taken 3/30/2025 2350)  Verbalizes/displays adequate comfort level or baseline comfort level:   Consider cultural and social influences on pain and pain management   Administer analgesics based on type and severity of pain and evaluate response   Encourage patient to monitor pain and request assistance   Implement non-pharmacological measures as appropriate and evaluate response   Assess pain using appropriate pain scale   Notify Licensed Independent Practitioner if interventions unsuccessful or patient reports new pain     Problem: ABCDS Injury Assessment  Goal: Absence of physical injury  3/30/2025 2350 by Rocio Bhandari RN  Outcome: Progressing  Flowsheets (Taken 3/30/2025 2350)  Absence of Physical Injury: Implement safety measures based on patient assessment

## 2025-03-31 NOTE — PROGRESS NOTES
Data- discharge order received, pt verbalized agreement to discharge, disposition to previous residence, no needs for HHC/DME.     Action- discharge instructions prepared and given to patient, pt verbalized understanding. Medication information packet given r/t NEW prescriptions emphasizing name/purpose/side effects, pt verbalized understanding. Discharge instruction summary: Diet- regular, Activity- UAL, Primary Care Physician as follows: No primary care provider on file. None f/u appointment advised to schedule with MD Olivares in 2 weeks, prescription medications filled with [facility].    Response- Pt belongings gathered, IV removed. Disposition is home (no HHC/DME needs), transported with this RN, taken to lobby via w/c w/ friend, no complications.     Electronically signed by Radha Ngo RN on 3/31/2025 at 2:42 PM

## 2025-03-31 NOTE — PLAN OF CARE
Problem: Discharge Planning  Goal: Discharge to home or other facility with appropriate resources  3/31/2025 0747 by Radha Ngo RN  Outcome: Progressing  Flowsheets (Taken 3/31/2025 0747)  Discharge to home or other facility with appropriate resources:   Identify barriers to discharge with patient and caregiver   Arrange for needed discharge resources and transportation as appropriate   Identify discharge learning needs (meds, wound care, etc)  3/30/2025 2350 by Rocio Bhandari RN  Outcome: Progressing  Flowsheets (Taken 3/30/2025 2350)  Discharge to home or other facility with appropriate resources:   Identify barriers to discharge with patient and caregiver   Identify discharge learning needs (meds, wound care, etc)   Refer to discharge planning if patient needs post-hospital services based on physician order or complex needs related to functional status, cognitive ability or social support system   Arrange for needed discharge resources and transportation as appropriate     Problem: Pain  Goal: Verbalizes/displays adequate comfort level or baseline comfort level  3/31/2025 0747 by Radha Ngo RN  Outcome: Progressing  Flowsheets (Taken 3/31/2025 0747)  Verbalizes/displays adequate comfort level or baseline comfort level:   Encourage patient to monitor pain and request assistance   Assess pain using appropriate pain scale   Implement non-pharmacological measures as appropriate and evaluate response   Administer analgesics based on type and severity of pain and evaluate response  3/30/2025 2350 by Rocio Bhandari RN  Outcome: Progressing  Flowsheets (Taken 3/30/2025 2350)  Verbalizes/displays adequate comfort level or baseline comfort level:   Consider cultural and social influences on pain and pain management   Administer analgesics based on type and severity of pain and evaluate response   Encourage patient to monitor pain and request assistance   Implement non-pharmacological measures as appropriate and

## 2025-03-31 NOTE — PROGRESS NOTES
Patient resting in bed this morning with no complaints of pain at this time. Scheduled morning medications given without complications. See eMAR for documentation of medication administration. IV to the L hand flushed w/o complication and is saline locked at this time. Alcohol cap in place.     Head to toe assessment completed and charted. See flowsheets for documentation.     Patient updated with plan of care for the shift, denies questions. Patient denies further physical/emotional needs at this time. Call light, telephone, and bed side table are within reach. Fall precautions in place. Will continue to monitor and assess.     Electronically signed by Radha Ngo RN on 3/31/2025 at 10:56 AM

## 2025-03-31 NOTE — CARE COORDINATION
Per chart review, patient is alert and oriented, independent, has insurance  No PCP noted- provided [facility] PCP list    Please advise Case Management patient will have discharge planning needs.   Electronically signed by Janina Turpin on 3/31/2025 at 2:25 PM  #[phone]

## 2025-03-31 NOTE — PROGRESS NOTES
CLINICAL PHARMACY NOTE: MEDS TO BEDS    Total # of Prescriptions Filled: 2   The following medications were delivered to the patient:  Current Discharge Medication List        START taking these medications    Details   oxyCODONE-acetaminophen (PERCOCET) 5-325 MG per tablet Take 1 tablet by mouth every 6 hours as needed for Pain for up to 5 days. Intended supply: 5 days. Take lowest dose possible to manage pain Max Daily Amount: 4 tablets  Qty: 20 tablet, Refills: 0    Comments: Reduce doses taken as pain becomes manageable  Associated Diagnoses: Perirectal abscess; Abscess      amoxicillin-clavulanate (AUGMENTIN) 875-125 MG per tablet Take 1 tablet by mouth 2 times daily for 7 days  Qty: 14 tablet, Refills: 0               Additional Documentation: Picked up by patient

## 2025-04-03 LAB
BACTERIA BLD CULT ORG #2: NORMAL
BACTERIA BLD CULT: NORMAL

## 2025-04-04 LAB
BACTERIA SPEC AEROBE CULT: ABNORMAL
BACTERIA SPEC AEROBE CULT: ABNORMAL
BACTERIA SPEC ANAEROBE CULT: ABNORMAL
BACTERIA SPEC ANAEROBE CULT: ABNORMAL
GRAM STN SPEC: ABNORMAL
ORGANISM: ABNORMAL

## 2025-04-04 NOTE — DISCHARGE SUMMARY
Discharge Summary    Date: 4/4/2025  Patient Name: Jyoti Polo    YOB: 1990     Age: 35 y.o.    Admit Date: 3/29/2025  Discharge Date: 3/31/2025  Discharge Condition: Good    Admission Diagnosis  Perirectal abscess [K61.1]      Discharge Diagnosis  Principal Problem:    Perirectal abscess  Resolved Problems:    * No resolved hospital problems. *      Hospital Stay  Narrative of Hospital Course:  Admitted with perirectal abscess. Started on IV antibiotics and underwent I&D in OR on 3/30. Did well postop and discharge on PO antibiotics.    Consultants:  PHARMACY TO DOSE VANCOMYCIN    Surgeries/procedures Performed:      Treatments:    Surgery and Antibiotics        Discharge Plan/Disposition:  Home    Hospital/Incidental Findings Requiring Follow Up:    Patient Instructions:    Diet: Regular Diet    Activity:Activiity as Tolerated, No Driving for Today  For number of days (if applicable):      Other Instructions:    Provider Follow-Up:   No follow-ups on file.     Significant Diagnostic Studies:    Recent Labs:  Admission on 03/29/2025, Discharged on 03/31/2025  WBC                                           Date: 03/29/2025  Value: 13.9 (H)    Ref range: 4.0 - 11.0 K/uL    Status: Final  RBC                                           Date: 03/29/2025  Value: 4.88        Ref range: 4.00 - 5.20 M/uL   Status: Final  Hemoglobin                                    Date: 03/29/2025  Value: 13.4        Ref range: 12.0 - 16.0 g/dL   Status: Final  Hematocrit                                    Date: 03/29/2025  Value: 40.3        Ref range: 36.0 - 48.0 %      Status: Final  MCV                                           Date: 03/29/2025  Value: 82.5        Ref range: 80.0 - 100.0 fL    Status: Final  MCH                                           Date: 03/29/2025  Value: 27.4        Ref range: 26.0 - 34.0 pg     Status: Final  MCHC                                          Date: 03/29/2025  Value: 33.2        Ref